# Patient Record
Sex: FEMALE | Race: BLACK OR AFRICAN AMERICAN | NOT HISPANIC OR LATINO | URBAN - METROPOLITAN AREA
[De-identification: names, ages, dates, MRNs, and addresses within clinical notes are randomized per-mention and may not be internally consistent; named-entity substitution may affect disease eponyms.]

---

## 2022-03-20 ENCOUNTER — APPOINTMENT (OUTPATIENT)
Dept: CT IMAGING | Facility: HOSPITAL | Age: 42
End: 2022-03-20

## 2022-03-20 ENCOUNTER — HOSPITAL ENCOUNTER (EMERGENCY)
Facility: HOSPITAL | Age: 42
Discharge: HOME | End: 2022-03-20
Attending: EMERGENCY MEDICINE | Admitting: EMERGENCY MEDICINE

## 2022-03-20 ENCOUNTER — APPOINTMENT (OUTPATIENT)
Dept: GENERAL RADIOLOGY | Facility: HOSPITAL | Age: 42
End: 2022-03-20

## 2022-03-20 VITALS
HEART RATE: 66 BPM | TEMPERATURE: 98.5 F | SYSTOLIC BLOOD PRESSURE: 124 MMHG | WEIGHT: 135.58 LBS | OXYGEN SATURATION: 100 % | DIASTOLIC BLOOD PRESSURE: 84 MMHG | HEIGHT: 66 IN | BODY MASS INDEX: 21.79 KG/M2 | RESPIRATION RATE: 18 BRPM

## 2022-03-20 DIAGNOSIS — F10.920 ALCOHOLIC INTOXICATION WITHOUT COMPLICATION: ICD-10-CM
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
## 2022-03-20 LAB
ETHANOL BLD-MCNC: 109 MG/DL (ref 0–10)
ETHANOL UR QL: 0.11 %
HCG INTACT+B SERPL-ACNC: <0.5 MIU/ML
HOLD SPECIMEN: NORMAL
WHOLE BLOOD HOLD SPECIMEN: NORMAL
WHOLE BLOOD HOLD SPECIMEN: NORMAL

## 2022-03-20 PROCEDURE — 84702 CHORIONIC GONADOTROPIN TEST: CPT | Performed by: EMERGENCY MEDICINE

## 2022-03-20 PROCEDURE — 99284 EMERGENCY DEPT VISIT MOD MDM: CPT

## 2022-03-20 PROCEDURE — 70450 CT HEAD/BRAIN W/O DYE: CPT

## 2022-03-20 PROCEDURE — 36415 COLL VENOUS BLD VENIPUNCTURE: CPT

## 2022-03-20 PROCEDURE — 74177 CT ABD & PELVIS W/CONTRAST: CPT

## 2022-03-20 PROCEDURE — 82077 ASSAY SPEC XCP UR&BREATH IA: CPT | Performed by: EMERGENCY MEDICINE

## 2022-03-20 PROCEDURE — 73630 X-RAY EXAM OF FOOT: CPT

## 2022-03-20 PROCEDURE — 0 IOPAMIDOL PER 1 ML: Performed by: EMERGENCY MEDICINE

## 2022-03-20 RX ORDER — RANITIDINE 150 MG/1
150 CAPSULE ORAL DAILY
COMMUNITY

## 2022-03-20 RX ORDER — LIDOCAINE HYDROCHLORIDE 10 MG/ML
10 INJECTION, SOLUTION EPIDURAL; INFILTRATION; INTRACAUDAL; PERINEURAL ONCE
Status: COMPLETED | OUTPATIENT
Start: 2022-03-20 | End: 2022-03-20

## 2022-03-20 RX ADMIN — IOPAMIDOL 100 ML: 755 INJECTION, SOLUTION INTRAVENOUS at 07:39

## 2022-03-20 RX ADMIN — LIDOCAINE HYDROCHLORIDE 10 ML: 10 INJECTION, SOLUTION EPIDURAL; INFILTRATION; INTRACAUDAL; PERINEURAL at 07:59

## 2024-07-01 ENCOUNTER — APPOINTMENT (OUTPATIENT)
Dept: GENERAL RADIOLOGY | Facility: HOSPITAL | Age: 44
End: 2024-07-01
Payer: MEDICAID

## 2024-07-01 ENCOUNTER — HOSPITAL ENCOUNTER (EMERGENCY)
Facility: HOSPITAL | Age: 44
Discharge: HOME | End: 2024-07-01
Attending: EMERGENCY MEDICINE | Admitting: EMERGENCY MEDICINE
Payer: MEDICAID

## 2024-07-01 VITALS
DIASTOLIC BLOOD PRESSURE: 66 MMHG | WEIGHT: 136.91 LBS | BODY MASS INDEX: 22 KG/M2 | OXYGEN SATURATION: 100 % | HEART RATE: 70 BPM | TEMPERATURE: 98 F | HEIGHT: 66 IN | SYSTOLIC BLOOD PRESSURE: 118 MMHG | RESPIRATION RATE: 20 BRPM

## 2024-07-01 DIAGNOSIS — M47.812 CERVICAL SPONDYLOSIS: Primary | ICD-10-CM

## 2024-07-01 PROCEDURE — 72100 X-RAY EXAM L-S SPINE 2/3 VWS: CPT

## 2024-07-01 PROCEDURE — 72050 X-RAY EXAM NECK SPINE 4/5VWS: CPT

## 2024-07-01 PROCEDURE — 96372 THER/PROPH/DIAG INJ SC/IM: CPT

## 2024-07-01 PROCEDURE — 99283 EMERGENCY DEPT VISIT LOW MDM: CPT

## 2024-07-01 PROCEDURE — 72072 X-RAY EXAM THORAC SPINE 3VWS: CPT

## 2024-07-01 PROCEDURE — 25010000002 DEXAMETHASONE SODIUM PHOSPHATE 10 MG/ML SOLUTION

## 2024-07-01 RX ORDER — DEXAMETHASONE SODIUM PHOSPHATE 10 MG/ML
10 INJECTION, SOLUTION INTRAMUSCULAR; INTRAVENOUS ONCE
Status: COMPLETED | OUTPATIENT
Start: 2024-07-01 | End: 2024-07-01

## 2024-07-01 RX ORDER — CYCLOBENZAPRINE HCL 10 MG
10 TABLET ORAL ONCE
Status: COMPLETED | OUTPATIENT
Start: 2024-07-01 | End: 2024-07-01

## 2024-07-01 RX ADMIN — CYCLOBENZAPRINE 10 MG: 10 TABLET, FILM COATED ORAL at 08:08

## 2024-07-01 RX ADMIN — DEXAMETHASONE SODIUM PHOSPHATE 10 MG: 10 INJECTION INTRAMUSCULAR; INTRAVENOUS at 08:08

## 2024-07-01 NOTE — ED PROVIDER NOTES
Time: 9:18 AM EDT  Date of encounter:  7/1/2024  Independent Historian/Clinical History and Information was obtained by:   Patient    History is limited by: N/A    Chief Complaint: Neck and back pain      History of Present Illness:  Patient is a 44 y.o. year old female who presents to the emergency department for evaluation of neck and back pain that has been present for months but worse in the last couple of days per patient.  Patient denies injury/trauma.  Patient states she has a history of arthritis.  Patient denies surgical history.  Patient denies urinary/bowel incontinence.  Patient states she is able to ambulate.    HPI    Patient Care Team  Primary Care Provider: Provider, No Known    Past Medical History:     No Known Allergies  History reviewed. No pertinent past medical history.  History reviewed. No pertinent surgical history.  History reviewed. No pertinent family history.    Home Medications:  Prior to Admission medications    Medication Sig Start Date End Date Taking? Authorizing Provider   raNITIdine (ZANTAC) 150 MG capsule Take 150 mg by mouth Daily.    Provider, Historical, MD        Social History:   Social History     Tobacco Use    Smoking status: Never    Smokeless tobacco: Never   Substance Use Topics    Alcohol use: Yes     Comment: occasional    Drug use: Yes     Types: Marijuana         Review of Systems:  Review of Systems   Constitutional:  Negative for chills and fever.   HENT:  Negative for congestion, rhinorrhea and sore throat.    Eyes:  Negative for pain and visual disturbance.   Respiratory:  Negative for apnea, cough, chest tightness and shortness of breath.    Cardiovascular:  Negative for chest pain and palpitations.   Gastrointestinal:  Negative for abdominal pain, diarrhea, nausea and vomiting.   Genitourinary:  Negative for difficulty urinating and dysuria.   Musculoskeletal:  Positive for back pain and neck pain. Negative for joint swelling and myalgias.   Skin:  Negative  "for color change.   Neurological:  Negative for seizures and headaches.   Psychiatric/Behavioral: Negative.     All other systems reviewed and are negative.       Physical Exam:  /64   Pulse 67   Temp 98.1 °F (36.7 °C) (Oral)   Resp 20   Ht 167.6 cm (66\")   Wt 62.1 kg (136 lb 14.5 oz)   LMP 06/24/2024 (Approximate)   SpO2 100%   BMI 22.10 kg/m²     Physical Exam  Vitals and nursing note reviewed.   Constitutional:       General: She is not in acute distress.     Appearance: Normal appearance. She is not toxic-appearing.   HENT:      Head: Normocephalic and atraumatic.      Jaw: There is normal jaw occlusion.   Eyes:      General: Lids are normal.      Extraocular Movements: Extraocular movements intact.      Conjunctiva/sclera: Conjunctivae normal.      Pupils: Pupils are equal, round, and reactive to light.   Cardiovascular:      Rate and Rhythm: Normal rate and regular rhythm.      Pulses: Normal pulses.      Heart sounds: Normal heart sounds.   Pulmonary:      Effort: Pulmonary effort is normal. No respiratory distress.      Breath sounds: Normal breath sounds. No wheezing or rhonchi.   Abdominal:      General: Abdomen is flat.      Palpations: Abdomen is soft.      Tenderness: There is no abdominal tenderness. There is no guarding or rebound.   Musculoskeletal:         General: Tenderness (Mild cervical, thoracic, and lumbar paraspinal tenderness appreciated upon palpation.) present. Normal range of motion.      Cervical back: Normal range of motion and neck supple.      Right lower leg: No edema.      Left lower leg: No edema.   Skin:     General: Skin is warm and dry.   Neurological:      Mental Status: She is alert and oriented to person, place, and time. Mental status is at baseline.   Psychiatric:         Mood and Affect: Mood normal.                  Procedures:  Procedures      Medical Decision Making:      Comorbidities that affect care:    None    External Notes reviewed:          The " following orders were placed and all results were independently analyzed by me:  Orders Placed This Encounter   Procedures    XR Spine Cervical Complete 4 or 5 View    XR Spine Thoracic 3 View    XR Spine Lumbar 2 or 3 View    Ambulatory Referral to Neurosurgery       Medications Given in the Emergency Department:  Medications   dexAMETHasone sodium phosphate injection 10 mg (10 mg Intramuscular Given 7/1/24 0808)   cyclobenzaprine (FLEXERIL) tablet 10 mg (10 mg Oral Given 7/1/24 0808)        ED Course:         Labs:    Lab Results (last 24 hours)       ** No results found for the last 24 hours. **             Imaging:    XR Spine Cervical Complete 4 or 5 View    Result Date: 7/1/2024  XR SPINE CERVICAL COMPLETE 4 OR 5 VW, XR SPINE LUMBAR 2 OR 3 VW, XR SPINE THORACIC 3 VW Date of Exam: 7/1/2024 7:45 AM EDT Indication: pain Comparison: None available. Findings: Cervical vertebral body heights are maintained without evidence of acute fracture and alignment is anatomic without evidence of listhesis or subluxation. Multilevel spondylosis change is present, with areas of disc osteophyte complex formation and facet arthropathy, most notable at C4-5. The prevertebral soft tissues are normal. The dens is intact. Thoracic vertebral body heights are maintained without evidence of fracture and alignment is anatomic. There is evidence of mild thoracic spondylosis with some small osteophytes and mild multilevel loss of disc space height. Lumbar vertebral body heights are maintained without evidence of acute fracture and alignment is anatomic without evidence of listhesis or subluxation. Mild spondylosis changes are evident with some areas of disc space height loss, facet arthropathy and osteophyte formation, most notable at L2-3 and L3-4. The SI joints demonstrate mild symmetric degenerative change.     Impression: Generally mild multilevel spondylosis is present throughout. There is no evidence of fracture or malalignment  involving the cervical, thoracic and lumbar spine. Electronically Signed: Yohannes Starks MD  7/1/2024 8:07 AM EDT  Workstation ID: [id number]    XR Spine Thoracic 3 View    Result Date: 7/1/2024  XR SPINE CERVICAL COMPLETE 4 OR 5 VW, XR SPINE LUMBAR 2 OR 3 VW, XR SPINE THORACIC 3 VW Date of Exam: 7/1/2024 7:45 AM EDT Indication: pain Comparison: None available. Findings: Cervical vertebral body heights are maintained without evidence of acute fracture and alignment is anatomic without evidence of listhesis or subluxation. Multilevel spondylosis change is present, with areas of disc osteophyte complex formation and facet arthropathy, most notable at C4-5. The prevertebral soft tissues are normal. The dens is intact. Thoracic vertebral body heights are maintained without evidence of fracture and alignment is anatomic. There is evidence of mild thoracic spondylosis with some small osteophytes and mild multilevel loss of disc space height. Lumbar vertebral body heights are maintained without evidence of acute fracture and alignment is anatomic without evidence of listhesis or subluxation. Mild spondylosis changes are evident with some areas of disc space height loss, facet arthropathy and osteophyte formation, most notable at L2-3 and L3-4. The SI joints demonstrate mild symmetric degenerative change.     Impression: Generally mild multilevel spondylosis is present throughout. There is no evidence of fracture or malalignment involving the cervical, thoracic and lumbar spine. Electronically Signed: Yohannes Starks MD  7/1/2024 8:07 AM EDT  Workstation ID: [id number]    XR Spine Lumbar 2 or 3 View    Result Date: 7/1/2024  XR SPINE CERVICAL COMPLETE 4 OR 5 VW, XR SPINE LUMBAR 2 OR 3 VW, XR SPINE THORACIC 3 VW Date of Exam: 7/1/2024 7:45 AM EDT Indication: pain Comparison: None available. Findings: Cervical vertebral body heights are maintained without evidence of acute fracture and alignment is anatomic without evidence of  listhesis or subluxation. Multilevel spondylosis change is present, with areas of disc osteophyte complex formation and facet arthropathy, most notable at C4-5. The prevertebral soft tissues are normal. The dens is intact. Thoracic vertebral body heights are maintained without evidence of fracture and alignment is anatomic. There is evidence of mild thoracic spondylosis with some small osteophytes and mild multilevel loss of disc space height. Lumbar vertebral body heights are maintained without evidence of acute fracture and alignment is anatomic without evidence of listhesis or subluxation. Mild spondylosis changes are evident with some areas of disc space height loss, facet arthropathy and osteophyte formation, most notable at L2-3 and L3-4. The SI joints demonstrate mild symmetric degenerative change.     Impression: Generally mild multilevel spondylosis is present throughout. There is no evidence of fracture or malalignment involving the cervical, thoracic and lumbar spine. Electronically Signed: Yohannes Starks MD  7/1/2024 8:07 AM EDT  Workstation ID: [id number]       Differential Diagnosis and Discussion:    Back Pain: The patient presents with back pain. My differential diagnosis includes but is not limited to acute spinal epidural abscess, acute spinal epidural bleed, cauda equina syndrome, abdominal aortic aneurysm, aortic dissection, kidney stone, pyelonephritis, musculoskeletal back pain, spinal fracture, and osteoarthritis.   Neck Pain: The patient presents with neck pain. My differential diagnosis includes but is not limited to acute spinal epidural abscess, acute spinal epidural bleed, meningitis, musculoskeletal neck pain, spinal fracture, and osteoarthritis.     All X-rays impressions were independently interpreted by me.    MDM     X-rays cervical, thoracic, and lumbar spine shows spondylosis.  I will write patient off work for couple days and provide an ambulatory referral to neurosurgery for  follow-up.  Instructed patient to return to ED if she develops any new or worsening symptoms.  Patient states she understands and agrees with plan of care.          Patient Care Considerations:          Consultants/Shared Management Plan:    None    Social Determinants of Health:    Patient is independent, reliable, and has access to care.       Disposition and Care Coordination:    Discharged: The patient is suitable and stable for discharge with no need for consideration of admission.    I have explained the patient´s condition, diagnoses and treatment plan based on the information available to me at this time. I have answered questions and addressed any concerns. The patient has a good  understanding of the patient´s diagnosis, condition, and treatment plan as can be expected at this point. The vital signs have been stable. The patient´s condition is stable and appropriate for discharge from the emergency department.      The patient will pursue further outpatient evaluation with the primary care physician or other designated or consulting physician as outlined in the discharge instructions. They are agreeable to this plan of care and follow-up instructions have been explained in detail. The patient has received these instructions in written format and has expressed an understanding of the discharge instructions. The patient is aware that any significant change in condition or worsening of symptoms should prompt an immediate return to this or the closest emergency department or call to 911.  I have explained discharge medications and the need for follow up with the patient/caretakers. This was also printed in the discharge instructions. Patient was discharged with the following medications and follow up:      Medication List      No changes were made to your prescriptions during this visit.      Diogo Manriquez MD  [address] [place] [address]  [phone]    Call in 1 day  To schedule  follow-up       Final diagnoses:   Cervical spondylosis        ED Disposition       ED Disposition   Discharge    Condition   Stable    Comment   --               This medical record created using voice recognition software.             Hever Francisco PA-C  07/01/24 0978

## 2024-07-01 NOTE — Clinical Note
[facility] EMERGENCY ROOM  [address] [place] [address]  Phone: [phone]  Fax: [phone]    Enriqueta Guerrero was seen and treated in our emergency department on 7/1/2024.  She may return to work on 07/02/2024.         Thank you for choosing [facility].    Hever Francisco PA-C

## 2024-07-29 ENCOUNTER — HOSPITAL ENCOUNTER (EMERGENCY)
Facility: HOSPITAL | Age: 44
Discharge: HOME | End: 2024-07-29
Attending: EMERGENCY MEDICINE
Payer: OTHER MISCELLANEOUS

## 2024-07-29 ENCOUNTER — APPOINTMENT (OUTPATIENT)
Dept: GENERAL RADIOLOGY | Facility: HOSPITAL | Age: 44
End: 2024-07-29
Payer: OTHER MISCELLANEOUS

## 2024-07-29 VITALS
HEART RATE: 60 BPM | DIASTOLIC BLOOD PRESSURE: 76 MMHG | TEMPERATURE: 98.4 F | RESPIRATION RATE: 18 BRPM | OXYGEN SATURATION: 99 % | SYSTOLIC BLOOD PRESSURE: 129 MMHG

## 2024-07-29 DIAGNOSIS — M25.532 LEFT WRIST PAIN: ICD-10-CM

## 2024-07-29 DIAGNOSIS — S63.502A SPRAIN OF LEFT WRIST, INITIAL ENCOUNTER: Primary | ICD-10-CM

## 2024-07-29 PROCEDURE — 25010000002 KETOROLAC TROMETHAMINE PER 15 MG

## 2024-07-29 PROCEDURE — 97161 PT EVAL LOW COMPLEX 20 MIN: CPT | Performed by: PHYSICAL THERAPIST

## 2024-07-29 PROCEDURE — 99283 EMERGENCY DEPT VISIT LOW MDM: CPT

## 2024-07-29 PROCEDURE — 97110 THERAPEUTIC EXERCISES: CPT | Performed by: PHYSICAL THERAPIST

## 2024-07-29 PROCEDURE — 73110 X-RAY EXAM OF WRIST: CPT

## 2024-07-29 PROCEDURE — 96372 THER/PROPH/DIAG INJ SC/IM: CPT

## 2024-07-29 RX ORDER — KETOROLAC TROMETHAMINE 30 MG/ML
30 INJECTION, SOLUTION INTRAMUSCULAR; INTRAVENOUS ONCE
Status: COMPLETED | OUTPATIENT
Start: 2024-07-29 | End: 2024-07-29

## 2024-07-29 RX ADMIN — KETOROLAC TROMETHAMINE 30 MG: 30 INJECTION, SOLUTION INTRAMUSCULAR; INTRAVENOUS at 14:59

## 2024-07-29 NOTE — THERAPY EVALUATION
Patient Name: Enriqueta Guerrero  : 1980    MRN: [id number]                              Today's Date: 2024       Admit Date: 2024    Visit Dx:     ICD-10-CM ICD-9-CM   1. Left wrist pain  M25.532 719.43     There is no problem list on file for this patient.    History reviewed. No pertinent past medical history.  History reviewed. No pertinent surgical history.   General Information       Row Name 24 1445          Physical Therapy Time and Intention    Document Type evaluation  -LR     Mode of Treatment individual therapy  -LR       Row Name 24 1445          General Information    Patient Profile Reviewed yes  -LR     Prior Level of Function independent:  -LR               User Key  (r) = Recorded By, (t) = Taken By, (c) = Cosigned By      Initials Name Provider Type    LR Myriam Christopher PT Physical Therapist                History: Patient states she was at work today and lifted a tote and started having pain in her left wrist.  Her pain is currently 8/10.  She is right-hand dominant.  She denies numbness and tingling into her hand.  She states she has not taken any medicine since injuring it but she did apply ice.    Objective:    Palpation: Tender to palpation at left fourth metatarsal, ulnar side of wrist, hypothenar eminence    ROM:  Active Wrist ROM:  L Wrist AROM:  R Wrist AROM:  Flexion: 25°   Flexion: NT  Extension: 35°   Extension: NT  Radial Deviation: 20°  Radial Deviation: NT  Ulnar Deviation: 18°  Ulnar Deviation: NT    Full left supination and pronation but painful  Decreased left thumb opposition    Strength:  L Wrist MMT:    R Wrist MMT:  Flexion: 3-   flexion: NT  Extension: 3 -   Extension: NT     Sensation: Left UE sensation intact to light touch    Assessment/Plan:   Pt presents with a diagnosis of left wrist pain and has decreased left wrist range of motion that are limiting her ability to lift and grasp.  The patient was educated in gentle range of motion exercises  and provided with a HEP handout.  She was also placed in a wrist brace.    Goals:   LTG 1: The patient will be independent in HEP in order to decrease pain and improve tolerance to functional activities.  STATUS: Met    Interventions:   Manual Therapy: Not performed    Therapeutic Exercises: HEP: Wrist flexion/extension in neutral position, wrist ulnar deviation/radial deviation in pronation, finger abduction/adduction, thumb opposition, supination/pronation    Modalities: Not performed     Outcome Measures       Row Name 07/29/24 1446          Optimal Instrument    Optimal Instrument Optimal - 3  -LR     Grasping 4  -LR     Lifting 4  -LR     Carrying 4  -LR     From the list, choose the 3 activities you would most like to be able to do without any difficulty Lifting;Grasping;Carrying  -LR     Total Score Optimal - 3 12  -LR       Row Name 07/29/24 1446          Functional Assessment    Outcome Measure Options Optimal Instrument  -LR               User Key  (r) = Recorded By, (t) = Taken By, (c) = Cosigned By      Initials Name Provider Type    Myriam Farley, PT Physical Therapist                     Time Calculation:   PT Evaluation Complexity  History, PT Evaluation Complexity: no personal factors and/or comorbidities  Examination of Body Systems (PT Eval Complexity): 1-2 elements  Clinical Presentation (PT Evaluation Complexity): stable  Clinical Decision Making (PT Evaluation Complexity): low complexity  Overall Complexity (PT Evaluation Complexity): low complexity     PT Charges       Row Name 07/29/24 1447             Time Calculation    PT Received On 07/29/24  -LR         Timed Charges    85157 - PT Therapeutic Exercise Minutes 8  -LR         Untimed Charges    PT Eval/Re-eval Minutes 13  -LR         Total Minutes    Timed Charges Total Minutes 8  -LR      Untimed Charges Total Minutes 13  -LR       Total Minutes 21  -LR                User Key  (r) = Recorded By, (t) = Taken By, (c) = Cosigned By       Initials Name Provider Type    LR Myriam Christopher, PT Physical Therapist                  Therapy Charges for Today       Code Description Service Date Service Provider Modifiers Qty    75864342075 HC PT THER PROC EA 15 MIN 7/29/2024 Myriam Christopher, PT GP 1    43670198606 HC PT EVAL LOW COMPLEXITY 1 7/29/2024 Myriam Christopher, PT GP 1            PT G-Codes  Outcome Measure Options: Optimal Instrument       Myriam Christopher, PT  7/29/2024

## 2024-07-29 NOTE — ED PROVIDER NOTES
Time: 2:48 PM EDT  Date of encounter:  7/29/2024  Independent Historian/Clinical History and Information was obtained by:   Patient    History is limited by: N/A    Chief Complaint: Left wrist pain      History of Present Illness:  Patient is a 44 y.o. year old female who presents to the emergency department for evaluation of left wrist pain that began today while lifting at work.  Patient denies any prior surgical history to her wrist.  Patient denies decreased sensation in fingers.      HPI    Patient Care Team  Primary Care Provider: Provider, No Known    Past Medical History:     No Known Allergies  History reviewed. No pertinent past medical history.  History reviewed. No pertinent surgical history.  History reviewed. No pertinent family history.    Home Medications:  Prior to Admission medications    Medication Sig Start Date End Date Taking? Authorizing Provider   raNITIdine (ZANTAC) 150 MG capsule Take 150 mg by mouth Daily.    Provider, Historical, MD        Social History:   Social History     Tobacco Use    Smoking status: Never    Smokeless tobacco: Never   Substance Use Topics    Alcohol use: Yes     Comment: occasional    Drug use: Yes     Types: Marijuana         Review of Systems:  Review of Systems   Constitutional:  Negative for chills and fever.   HENT:  Negative for congestion, rhinorrhea and sore throat.    Eyes:  Negative for pain and visual disturbance.   Respiratory:  Negative for apnea, cough, chest tightness and shortness of breath.    Cardiovascular:  Negative for chest pain and palpitations.   Gastrointestinal:  Negative for abdominal pain, diarrhea, nausea and vomiting.   Genitourinary:  Negative for difficulty urinating and dysuria.   Musculoskeletal:  Positive for arthralgias. Negative for joint swelling and myalgias.   Skin:  Negative for color change.   Neurological:  Negative for seizures and headaches.   Psychiatric/Behavioral: Negative.     All other systems reviewed and are  negative.       Physical Exam:  /84 (BP Location: Right arm, Patient Position: Sitting)   Pulse 66   Temp 98.2 °F (36.8 °C) (Oral)   Resp 18   LMP 06/24/2024 (Approximate)   SpO2 100%     Physical Exam  Vitals and nursing note reviewed.   Constitutional:       General: She is not in acute distress.     Appearance: Normal appearance. She is not toxic-appearing.   HENT:      Head: Normocephalic and atraumatic.      Jaw: There is normal jaw occlusion.   Eyes:      General: Lids are normal.      Extraocular Movements: Extraocular movements intact.      Conjunctiva/sclera: Conjunctivae normal.      Pupils: Pupils are equal, round, and reactive to light.   Cardiovascular:      Rate and Rhythm: Normal rate and regular rhythm.      Pulses: Normal pulses.      Heart sounds: Normal heart sounds.   Pulmonary:      Effort: Pulmonary effort is normal. No respiratory distress.      Breath sounds: Normal breath sounds. No wheezing or rhonchi.   Abdominal:      General: Abdomen is flat.      Palpations: Abdomen is soft.      Tenderness: There is no abdominal tenderness. There is no guarding or rebound.   Musculoskeletal:         General: Tenderness (Mild tenderness appreciated upon palpation to dorsal aspect left wrist) present. No deformity. Normal range of motion.      Cervical back: Normal range of motion and neck supple.      Right lower leg: No edema.      Left lower leg: No edema.   Skin:     General: Skin is warm and dry.   Neurological:      Mental Status: She is alert and oriented to person, place, and time. Mental status is at baseline.   Psychiatric:         Mood and Affect: Mood normal.                  Procedures:  Procedures      Medical Decision Making:      Comorbidities that affect care:    None    External Notes reviewed:          The following orders were placed and all results were independently analyzed by me:  Orders Placed This Encounter   Procedures    [facility] 06.  Wrist Brace    XR  Wrist 3+ View Left    Ambulatory Referral to Orthopedic Surgery    PT Consult: Eval & Treat Functional Mobility Below Baseline    PT Plan of Care Cert / Re-Cert       Medications Given in the Emergency Department:  Medications   ketorolac (TORADOL) injection 30 mg (has no administration in time range)        ED Course:         Labs:    Lab Results (last 24 hours)       ** No results found for the last 24 hours. **             Imaging:    XR Wrist 3+ View Left    Result Date: 7/29/2024  XR WRIST 3+ VW LEFT Date of Exam: 7/29/2024 2:00 PM EDT Indication: PAIN AND SWELLING Comparison: None available. Findings: No evidence of acute fracture nor dislocation. Alignment is normal. Joint space is adequately maintained. Soft tissues are unremarkable.     Impression: No acute osseous abnormality of the left wrist. Electronically Signed: Sayra Nettles MD  7/29/2024 2:16 PM EDT  Workstation ID: [id number]       Differential Diagnosis and Discussion:    Extremity Pain: Differential diagnosis includes but is not limited to soft tissue sprain, tendonitis, tendon injury, dislocation, fracture, deep vein thrombosis, arterial insufficiency, osteoarthritis, bursitis, and ligamentous damage.    All X-rays impressions were independently interpreted by me.    MDM     Amount and/or Complexity of Data Reviewed  Tests in the radiology section of CPT®: reviewed    X-ray shows no acute osseous abnormality.  We placed the patient in a wrist brace and will have patient follow-up with orthopedic surgery.  I instructed patient to return to ED if she [place] new or worsening symptoms.  Patient is resting comfortably at this time and states she understands and agrees with plan of care.            Patient Care Considerations:          Consultants/Shared Management Plan:    None    Social Determinants of Health:    Patient is independent, reliable, and has access to care.       Disposition and Care Coordination:    Discharged: The patient is suitable  and stable for discharge with no need for consideration of admission.    I have explained the patient´s condition, diagnoses and treatment plan based on the information available to me at this time. I have answered questions and addressed any concerns. The patient has a good  understanding of the patient´s diagnosis, condition, and treatment plan as can be expected at this point. The vital signs have been stable. The patient´s condition is stable and appropriate for discharge from the emergency department.      The patient will pursue further outpatient evaluation with the primary care physician or other designated or consulting physician as outlined in the discharge instructions. They are agreeable to this plan of care and follow-up instructions have been explained in detail. The patient has received these instructions in written format and has expressed an understanding of the discharge instructions. The patient is aware that any significant change in condition or worsening of symptoms should prompt an immediate return to this or the closest emergency department or call to 911.  I have explained discharge medications and the need for follow up with the patient/caretakers. This was also printed in the discharge instructions. Patient was discharged with the following medications and follow up:      Medication List      No changes were made to your prescriptions during this visit.      Eldon Myles MD  [address] [place], [place] [id number]  [phone]    Call in 1 day  To schedule follow-up       Final diagnoses:   Sprain of left wrist, initial encounter        ED Disposition       ED Disposition   Discharge    Condition   Stable    Comment   --               This medical record created using voice recognition software.             Hever Francisco PA-C  07/29/24 [id number]

## 2024-08-12 ENCOUNTER — TRANSCRIBE ORDERS (OUTPATIENT)
Dept: PHYSICAL THERAPY | Facility: CLINIC | Age: 44
End: 2024-08-12
Payer: MEDICAID

## 2024-08-12 DIAGNOSIS — S63.502A LEFT WRIST SPRAIN, INITIAL ENCOUNTER: Primary | ICD-10-CM
